# Patient Record
(demographics unavailable — no encounter records)

---

## 2024-12-11 NOTE — HISTORY OF PRESENT ILLNESS
[FreeTextEntry1] : annual wellness visit [de-identified] : Patient is a 55-year-old male with a past medical history as below who presents for an annual wellness visit. Patient is not currently taking any prescription medications but is taking a daily health supplement ([employer]), Tumeric, Vitamin-D 2000 IU 1-2 times a week. Patient has never had a screening colonoscopy. Patient is inconsistent with keeping up with ideal diet and walk/runs/resistance training 5-6 times a week.  Patient notes umbilical hernia which has been asymptomatic. Patient is not interested in receiving any vaccines today. He notes fatigue over the past 1-2 years.  He states that he works 7 days per week and has 3 children.  He has very little down time.  His diet is generally good although it can have moments where it is suboptimal when he is extremely busy.  He exercises regularly as noted above.  He denies fever, chills, palpitations, CO, RIDDLE. He has PMH kidney stones and microhematuria.  He had full work up of microhematuria through a urologist including cystoscopy which was negative. The patient has recently seen cardiologist, Dr. Tahir Paredes and was given RX for CT heart/calcium score test which he has yet to go for.

## 2024-12-11 NOTE — PHYSICAL EXAM
[No Acute Distress] : no acute distress [Well Nourished] : well nourished [Well Developed] : well developed [Well-Appearing] : well-appearing [Normal Sclera/Conjunctiva] : normal sclera/conjunctiva [PERRL] : pupils equal round and reactive to light [EOMI] : extraocular movements intact [Normal Outer Ear/Nose] : the outer ears and nose were normal in appearance [Normal Oropharynx] : the oropharynx was normal [No JVD] : no jugular venous distention [No Lymphadenopathy] : no lymphadenopathy [Supple] : supple [Thyroid Normal, No Nodules] : the thyroid was normal and there were no nodules present [No Respiratory Distress] : no respiratory distress  [No Accessory Muscle Use] : no accessory muscle use [Clear to Auscultation] : lungs were clear to auscultation bilaterally [Normal Rate] : normal rate  [Regular Rhythm] : with a regular rhythm [Normal S1, S2] : normal S1 and S2 [No Murmur] : no murmur heard [No Carotid Bruits] : no carotid bruits [No Abdominal Bruit] : a ~M bruit was not heard ~T in the abdomen [No Varicosities] : no varicosities [Pedal Pulses Present] : the pedal pulses are present [No Edema] : there was no peripheral edema [No Palpable Aorta] : no palpable aorta [Soft] : abdomen soft [Non Tender] : non-tender [Non-distended] : non-distended [No Masses] : no abdominal mass palpated [No HSM] : no HSM [Normal Bowel Sounds] : normal bowel sounds [Normal Posterior Cervical Nodes] : no posterior cervical lymphadenopathy [Normal Anterior Cervical Nodes] : no anterior cervical lymphadenopathy [No CVA Tenderness] : no CVA  tenderness [No Spinal Tenderness] : no spinal tenderness [No Joint Swelling] : no joint swelling [Grossly Normal Strength/Tone] : grossly normal strength/tone [No Rash] : no rash [Coordination Grossly Intact] : coordination grossly intact [No Focal Deficits] : no focal deficits [Normal Gait] : normal gait [Deep Tendon Reflexes (DTR)] : deep tendon reflexes were 2+ and symmetric [Normal Affect] : the affect was normal [Normal Insight/Judgement] : insight and judgment were intact [Speech Grossly Normal] : speech grossly normal [Memory Grossly Normal] : memory grossly normal [Alert and Oriented x3] : oriented to person, place, and time [Normal Mood] : the mood was normal [Normal Voice/Communication] : normal voice/communication [Normal TMs] : both tympanic membranes were normal [Normal Nasal Mucosa] : the nasal mucosa was normal [Declined Rectal Exam] : declined rectal exam [Normal Supraclavicular Nodes] : no supraclavicular lymphadenopathy [Kyphosis] : no kyphosis [Scoliosis] : no scoliosis [Acne] : no acne [de-identified] : overweight, umbilical hernia  [FreeTextEntry1] : to be done by GI/done by

## 2024-12-11 NOTE — HEALTH RISK ASSESSMENT
[Good] : ~his/her~  mood as  good [Yes] : Yes [Monthly or less (1 pt)] : Monthly or less (1 point) [3 or 4 (1 pt)] : 3 or 4  (1 point) [Less than monthly (1 pt)] : Less than monthly (1 point) [No] : In the past 12 months have you used drugs other than those required for medical reasons? No [Little interest or pleasure doing things] : 1) Little interest or pleasure doing things [Feeling down, depressed, or hopeless] : 2) Feeling down, depressed, or hopeless [0] : 2) Feeling down, depressed, or hopeless: Not at all (0) [Reports changes in vision] : Reports changes in vision [PHQ-2 Negative - No further assessment needed] : PHQ-2 Negative - No further assessment needed [Audit-CScore] : 3 [[id number]] : 0 [ColonoscopyComments] : Referred to Dr. Morales for consultation.

## 2024-12-11 NOTE — HEALTH RISK ASSESSMENT
[Good] : ~his/her~  mood as  good [Yes] : Yes [Monthly or less (1 pt)] : Monthly or less (1 point) [3 or 4 (1 pt)] : 3 or 4  (1 point) [Less than monthly (1 pt)] : Less than monthly (1 point) [No] : In the past 12 months have you used drugs other than those required for medical reasons? No [Little interest or pleasure doing things] : 1) Little interest or pleasure doing things [Feeling down, depressed, or hopeless] : 2) Feeling down, depressed, or hopeless [0] : 2) Feeling down, depressed, or hopeless: Not at all (0) [Reports changes in vision] : Reports changes in vision [PHQ-2 Negative - No further assessment needed] : PHQ-2 Negative - No further assessment needed [Audit-CScore] : 3 [[id number]] : 0 [ColonoscopyComments] : Referred to Dr. Morales for consultation.

## 2024-12-11 NOTE — HISTORY OF PRESENT ILLNESS
[FreeTextEntry1] : annual wellness visit [de-identified] : Patient is a 55-year-old male with a past medical history as below who presents for an annual wellness visit. Patient is not currently taking any prescription medications but is taking a daily health supplement ([employer]), Tumeric, Vitamin-D 2000 IU 1-2 times a week. Patient has never had a screening colonoscopy. Patient is inconsistent with keeping up with ideal diet and walk/runs/resistance training 5-6 times a week.  Patient notes umbilical hernia which has been asymptomatic. Patient is not interested in receiving any vaccines today. He notes fatigue over the past 1-2 years.  He states that he works 7 days per week and has 3 children.  He has very little down time.  His diet is generally good although it can have moments where it is suboptimal when he is extremely busy.  He exercises regularly as noted above.  He denies fever, chills, palpitations, CO, RIDDLE. He has PMH kidney stones and microhematuria.  He had full work up of microhematuria through a urologist including cystoscopy which was negative. The patient has recently seen cardiologist, Dr. Tahir Paredes and was given RX for CT heart/calcium score test which he has yet to go for.

## 2024-12-11 NOTE — PLAN
[FreeTextEntry1] : Musculoskeletal/Abdomen/General Surgery umbilical hernia- referred to Dr. Jacinto for further evaluation and discussion as patient is active and exercises/lifts weights etc. Cardiovascular history of heart murmur - check EKG (results as above) follow up with Dr. Paredes and advised to get CT Heart/Calcium Score test Gastroenterology screening colonoscopy - referred to gastroenterologist, Dr. Nazario Clay acid reflux - discussed Anti reflux measures - advised against spicy food consumption - advised smaller, more frequent meals - advised sitting upright for 3 hours after meals Urology history of microscopic hematuria with negative work up- check UA with Reflex Urine Culture  Immunization flu vaccine - discussed, declined Tdap (Adacel) Vaccine - discussed, declined Shingrix - discussed, declined  Nutrition -screening for nutritional disorder-check vitamin panel  check EKG (results as above), male panel, Vitamin Panel, hemoglobin A1C, and UA

## 2024-12-11 NOTE — PHYSICAL EXAM
[No Acute Distress] : no acute distress [Well Nourished] : well nourished [Well Developed] : well developed [Well-Appearing] : well-appearing [Normal Sclera/Conjunctiva] : normal sclera/conjunctiva [PERRL] : pupils equal round and reactive to light [EOMI] : extraocular movements intact [Normal Outer Ear/Nose] : the outer ears and nose were normal in appearance [Normal Oropharynx] : the oropharynx was normal [No JVD] : no jugular venous distention [No Lymphadenopathy] : no lymphadenopathy [Supple] : supple [Thyroid Normal, No Nodules] : the thyroid was normal and there were no nodules present [No Respiratory Distress] : no respiratory distress  [No Accessory Muscle Use] : no accessory muscle use [Clear to Auscultation] : lungs were clear to auscultation bilaterally [Normal Rate] : normal rate  [Regular Rhythm] : with a regular rhythm [Normal S1, S2] : normal S1 and S2 [No Murmur] : no murmur heard [No Carotid Bruits] : no carotid bruits [No Abdominal Bruit] : a ~M bruit was not heard ~T in the abdomen [No Varicosities] : no varicosities [Pedal Pulses Present] : the pedal pulses are present [No Edema] : there was no peripheral edema [No Palpable Aorta] : no palpable aorta [Soft] : abdomen soft [Non Tender] : non-tender [Non-distended] : non-distended [No Masses] : no abdominal mass palpated [No HSM] : no HSM [Normal Bowel Sounds] : normal bowel sounds [Normal Posterior Cervical Nodes] : no posterior cervical lymphadenopathy [Normal Anterior Cervical Nodes] : no anterior cervical lymphadenopathy [No CVA Tenderness] : no CVA  tenderness [No Spinal Tenderness] : no spinal tenderness [No Joint Swelling] : no joint swelling [Grossly Normal Strength/Tone] : grossly normal strength/tone [No Rash] : no rash [Coordination Grossly Intact] : coordination grossly intact [No Focal Deficits] : no focal deficits [Normal Gait] : normal gait [Deep Tendon Reflexes (DTR)] : deep tendon reflexes were 2+ and symmetric [Normal Affect] : the affect was normal [Normal Insight/Judgement] : insight and judgment were intact [Speech Grossly Normal] : speech grossly normal [Memory Grossly Normal] : memory grossly normal [Alert and Oriented x3] : oriented to person, place, and time [Normal Mood] : the mood was normal [Normal Voice/Communication] : normal voice/communication [Normal TMs] : both tympanic membranes were normal [Normal Nasal Mucosa] : the nasal mucosa was normal [Declined Rectal Exam] : declined rectal exam [Normal Supraclavicular Nodes] : no supraclavicular lymphadenopathy [Kyphosis] : no kyphosis [Scoliosis] : no scoliosis [Acne] : no acne [de-identified] : overweight, umbilical hernia  [FreeTextEntry1] : to be done by GI/done by

## 2024-12-11 NOTE — REVIEW OF SYSTEMS
[Vision Problems] : vision problems [FreeTextEntry7] : acid reflux  [FreeTextEntry9] : RLE fasciculations  [Fatigue] : fatigue [Negative] : Musculoskeletal

## 2024-12-11 NOTE — END OF VISIT
[FreeTextEntry3] : I, Tay Duncan, acted solely as scribe for Dr. Moises Fu DO on this date 12/11/2024.  All medical record entries made by the Scribe were at my, Dr. Moises Fu DO direction and personally dictated by me on 12/11/2024, I have reviewed the chart and agree that the record accurately reflects my personal performance of the history, physical exam, assessment and plan. I have also personally directed, reviewed and agreed with the chart.   [Time Spent: ___ minutes] : I have spent [unfilled] minutes of time on the encounter which excludes teaching and separately reported services.